# Patient Record
Sex: MALE | Race: WHITE | NOT HISPANIC OR LATINO | Employment: FULL TIME | URBAN - METROPOLITAN AREA
[De-identification: names, ages, dates, MRNs, and addresses within clinical notes are randomized per-mention and may not be internally consistent; named-entity substitution may affect disease eponyms.]

---

## 2024-08-04 ENCOUNTER — HOSPITAL ENCOUNTER (EMERGENCY)
Facility: HOSPITAL | Age: 44
Discharge: HOME | End: 2024-08-04
Attending: EMERGENCY MEDICINE
Payer: COMMERCIAL

## 2024-08-04 VITALS
OXYGEN SATURATION: 98 % | DIASTOLIC BLOOD PRESSURE: 72 MMHG | TEMPERATURE: 98 F | SYSTOLIC BLOOD PRESSURE: 120 MMHG | WEIGHT: 151.44 LBS | RESPIRATION RATE: 20 BRPM | HEART RATE: 69 BPM

## 2024-08-04 DIAGNOSIS — L03.211 FACIAL CELLULITIS: ICD-10-CM

## 2024-08-04 DIAGNOSIS — K11.20 PAROTID SIALADENITIS: Primary | ICD-10-CM

## 2024-08-04 LAB
ALBUMIN SERPL BCP-MCNC: 4.5 G/DL (ref 3.5–5.2)
ALLENS TEST: ABNORMAL
ALP SERPL-CCNC: 83 U/L (ref 55–135)
ALT SERPL W/O P-5'-P-CCNC: 43 U/L (ref 10–44)
ANION GAP SERPL CALC-SCNC: 17 MMOL/L (ref 8–16)
ANION GAP SERPL CALC-SCNC: 9 MMOL/L (ref 8–16)
AST SERPL-CCNC: 37 U/L (ref 10–40)
BASOPHILS # BLD AUTO: 0.03 K/UL (ref 0–0.2)
BASOPHILS NFR BLD: 0.3 % (ref 0–1.9)
BILIRUB SERPL-MCNC: 0.5 MG/DL (ref 0.1–1)
BUN SERPL-MCNC: 12 MG/DL (ref 6–20)
BUN SERPL-MCNC: 13 MG/DL (ref 6–30)
CALCIUM SERPL-MCNC: 9.5 MG/DL (ref 8.7–10.5)
CHLORIDE SERPL-SCNC: 105 MMOL/L (ref 95–110)
CHLORIDE SERPL-SCNC: 99 MMOL/L (ref 95–110)
CO2 SERPL-SCNC: 26 MMOL/L (ref 23–29)
CREAT SERPL-MCNC: 1.2 MG/DL (ref 0.5–1.4)
CREAT SERPL-MCNC: 1.2 MG/DL (ref 0.5–1.4)
DIFFERENTIAL METHOD BLD: ABNORMAL
EOSINOPHIL # BLD AUTO: 0.1 K/UL (ref 0–0.5)
EOSINOPHIL NFR BLD: 1.3 % (ref 0–8)
ERYTHROCYTE [DISTWIDTH] IN BLOOD BY AUTOMATED COUNT: 12.5 % (ref 11.5–14.5)
EST. GFR  (NO RACE VARIABLE): >60 ML/MIN/1.73 M^2
GLUCOSE SERPL-MCNC: 139 MG/DL (ref 70–110)
GLUCOSE SERPL-MCNC: 143 MG/DL (ref 70–110)
HCT VFR BLD AUTO: 41.4 % (ref 40–54)
HCT VFR BLD CALC: 42 %PCV (ref 36–54)
HGB BLD-MCNC: 14.1 G/DL (ref 14–18)
IMM GRANULOCYTES # BLD AUTO: 0.03 K/UL (ref 0–0.04)
IMM GRANULOCYTES NFR BLD AUTO: 0.3 % (ref 0–0.5)
LYMPHOCYTES # BLD AUTO: 1.7 K/UL (ref 1–4.8)
LYMPHOCYTES NFR BLD: 18.3 % (ref 18–48)
MCH RBC QN AUTO: 28.7 PG (ref 27–31)
MCHC RBC AUTO-ENTMCNC: 34.1 G/DL (ref 32–36)
MCV RBC AUTO: 84 FL (ref 82–98)
MONOCYTES # BLD AUTO: 0.5 K/UL (ref 0.3–1)
MONOCYTES NFR BLD: 5.3 % (ref 4–15)
NEUTROPHILS # BLD AUTO: 7.1 K/UL (ref 1.8–7.7)
NEUTROPHILS NFR BLD: 74.5 % (ref 38–73)
NRBC BLD-RTO: 0 /100 WBC
PLATELET # BLD AUTO: 237 K/UL (ref 150–450)
PMV BLD AUTO: 9.7 FL (ref 9.2–12.9)
POC IONIZED CALCIUM: 1.23 MMOL/L (ref 1.06–1.42)
POC TCO2 (MEASURED): 30 MMOL/L (ref 23–29)
POTASSIUM BLD-SCNC: 3.7 MMOL/L (ref 3.5–5.1)
POTASSIUM SERPL-SCNC: 3.8 MMOL/L (ref 3.5–5.1)
PROT SERPL-MCNC: 7.7 G/DL (ref 6–8.4)
RBC # BLD AUTO: 4.92 M/UL (ref 4.6–6.2)
SAMPLE: ABNORMAL
SITE: ABNORMAL
SODIUM BLD-SCNC: 142 MMOL/L (ref 136–145)
SODIUM SERPL-SCNC: 140 MMOL/L (ref 136–145)
WBC # BLD AUTO: 9.52 K/UL (ref 3.9–12.7)

## 2024-08-04 PROCEDURE — 96374 THER/PROPH/DIAG INJ IV PUSH: CPT

## 2024-08-04 PROCEDURE — 96361 HYDRATE IV INFUSION ADD-ON: CPT

## 2024-08-04 PROCEDURE — 25500020 PHARM REV CODE 255: Performed by: EMERGENCY MEDICINE

## 2024-08-04 PROCEDURE — 99285 EMERGENCY DEPT VISIT HI MDM: CPT | Mod: 25

## 2024-08-04 PROCEDURE — 63600175 PHARM REV CODE 636 W HCPCS

## 2024-08-04 PROCEDURE — 82962 GLUCOSE BLOOD TEST: CPT

## 2024-08-04 PROCEDURE — 80053 COMPREHEN METABOLIC PANEL: CPT

## 2024-08-04 PROCEDURE — 82565 ASSAY OF CREATININE: CPT

## 2024-08-04 PROCEDURE — 85025 COMPLETE CBC W/AUTO DIFF WBC: CPT

## 2024-08-04 PROCEDURE — 25000003 PHARM REV CODE 250

## 2024-08-04 RX ORDER — DEXAMETHASONE SODIUM PHOSPHATE 4 MG/ML
8 INJECTION, SOLUTION INTRA-ARTICULAR; INTRALESIONAL; INTRAMUSCULAR; INTRAVENOUS; SOFT TISSUE
Status: COMPLETED | OUTPATIENT
Start: 2024-08-04 | End: 2024-08-04

## 2024-08-04 RX ORDER — SODIUM CHLORIDE 9 MG/ML
1000 INJECTION, SOLUTION INTRAVENOUS
Status: COMPLETED | OUTPATIENT
Start: 2024-08-04 | End: 2024-08-04

## 2024-08-04 RX ORDER — AMOXICILLIN AND CLAVULANATE POTASSIUM 875; 125 MG/1; MG/1
1 TABLET, FILM COATED ORAL 2 TIMES DAILY
Qty: 14 TABLET | Refills: 0 | Status: SHIPPED | OUTPATIENT
Start: 2024-08-04

## 2024-08-04 RX ADMIN — SODIUM CHLORIDE 1000 ML: 9 INJECTION, SOLUTION INTRAVENOUS at 04:08

## 2024-08-04 RX ADMIN — DEXAMETHASONE SODIUM PHOSPHATE 8 MG: 4 INJECTION INTRA-ARTICULAR; INTRALESIONAL; INTRAMUSCULAR; INTRAVENOUS; SOFT TISSUE at 04:08

## 2024-08-04 RX ADMIN — IOHEXOL 75 ML: 350 INJECTION, SOLUTION INTRAVENOUS at 05:08

## 2024-11-16 ENCOUNTER — HOSPITAL ENCOUNTER (EMERGENCY)
Facility: HOSPITAL | Age: 44
Discharge: HOME | End: 2024-11-16
Attending: EMERGENCY MEDICINE
Payer: COMMERCIAL

## 2024-11-16 VITALS
TEMPERATURE: 98 F | HEART RATE: 78 BPM | OXYGEN SATURATION: 98 % | SYSTOLIC BLOOD PRESSURE: 120 MMHG | WEIGHT: 165 LBS | BODY MASS INDEX: 26.52 KG/M2 | RESPIRATION RATE: 18 BRPM | HEIGHT: 66 IN | DIASTOLIC BLOOD PRESSURE: 69 MMHG

## 2024-11-16 DIAGNOSIS — K11.20 SIALADENITIS: Primary | ICD-10-CM

## 2024-11-16 LAB
ALBUMIN SERPL BCP-MCNC: 4 G/DL (ref 3.5–5.2)
ALP SERPL-CCNC: 79 U/L (ref 40–150)
ALT SERPL W/O P-5'-P-CCNC: 45 U/L (ref 10–44)
ANION GAP SERPL CALC-SCNC: 9 MMOL/L (ref 8–16)
AST SERPL-CCNC: 31 U/L (ref 10–40)
BASOPHILS # BLD AUTO: 0.02 K/UL (ref 0–0.2)
BASOPHILS NFR BLD: 0.3 % (ref 0–1.9)
BILIRUB SERPL-MCNC: 0.6 MG/DL (ref 0.1–1)
BUN SERPL-MCNC: 10 MG/DL (ref 6–20)
CALCIUM SERPL-MCNC: 9.3 MG/DL (ref 8.7–10.5)
CHLORIDE SERPL-SCNC: 109 MMOL/L (ref 95–110)
CO2 SERPL-SCNC: 23 MMOL/L (ref 23–29)
CREAT SERPL-MCNC: 0.9 MG/DL (ref 0.5–1.4)
DIFFERENTIAL METHOD BLD: ABNORMAL
EOSINOPHIL # BLD AUTO: 0.1 K/UL (ref 0–0.5)
EOSINOPHIL NFR BLD: 1 % (ref 0–8)
ERYTHROCYTE [DISTWIDTH] IN BLOOD BY AUTOMATED COUNT: 12.5 % (ref 11.5–14.5)
EST. GFR  (NO RACE VARIABLE): >60 ML/MIN/1.73 M^2
GLUCOSE SERPL-MCNC: 104 MG/DL (ref 70–110)
HCT VFR BLD AUTO: 43 % (ref 40–54)
HGB BLD-MCNC: 13.9 G/DL (ref 14–18)
IMM GRANULOCYTES # BLD AUTO: 0.01 K/UL (ref 0–0.04)
IMM GRANULOCYTES NFR BLD AUTO: 0.1 % (ref 0–0.5)
LYMPHOCYTES # BLD AUTO: 1.5 K/UL (ref 1–4.8)
LYMPHOCYTES NFR BLD: 21.6 % (ref 18–48)
MCH RBC QN AUTO: 27.4 PG (ref 27–31)
MCHC RBC AUTO-ENTMCNC: 32.3 G/DL (ref 32–36)
MCV RBC AUTO: 85 FL (ref 82–98)
MONOCYTES # BLD AUTO: 0.6 K/UL (ref 0.3–1)
MONOCYTES NFR BLD: 8.2 % (ref 4–15)
NEUTROPHILS # BLD AUTO: 4.9 K/UL (ref 1.8–7.7)
NEUTROPHILS NFR BLD: 68.8 % (ref 38–73)
NRBC BLD-RTO: 0 /100 WBC
PLATELET # BLD AUTO: 242 K/UL (ref 150–450)
PMV BLD AUTO: 9.3 FL (ref 9.2–12.9)
POTASSIUM SERPL-SCNC: 3.9 MMOL/L (ref 3.5–5.1)
PROT SERPL-MCNC: 7.4 G/DL (ref 6–8.4)
RBC # BLD AUTO: 5.08 M/UL (ref 4.6–6.2)
SODIUM SERPL-SCNC: 141 MMOL/L (ref 136–145)
WBC # BLD AUTO: 7.08 K/UL (ref 3.9–12.7)

## 2024-11-16 PROCEDURE — 96374 THER/PROPH/DIAG INJ IV PUSH: CPT

## 2024-11-16 PROCEDURE — 63600175 PHARM REV CODE 636 W HCPCS: Performed by: NURSE PRACTITIONER

## 2024-11-16 PROCEDURE — 85025 COMPLETE CBC W/AUTO DIFF WBC: CPT | Performed by: NURSE PRACTITIONER

## 2024-11-16 PROCEDURE — 99285 EMERGENCY DEPT VISIT HI MDM: CPT | Mod: 25

## 2024-11-16 PROCEDURE — 80053 COMPREHEN METABOLIC PANEL: CPT | Performed by: NURSE PRACTITIONER

## 2024-11-16 RX ORDER — NAPROXEN 500 MG/1
500 TABLET ORAL 2 TIMES DAILY WITH MEALS
Qty: 14 TABLET | Refills: 0 | Status: SHIPPED | OUTPATIENT
Start: 2024-11-16

## 2024-11-16 RX ORDER — CEPHALEXIN 500 MG/1
500 CAPSULE ORAL EVERY 6 HOURS
Qty: 40 CAPSULE | Refills: 0 | Status: SHIPPED | OUTPATIENT
Start: 2024-11-16 | End: 2024-11-26

## 2024-11-16 RX ORDER — KETOROLAC TROMETHAMINE 30 MG/ML
15 INJECTION, SOLUTION INTRAMUSCULAR; INTRAVENOUS
Status: COMPLETED | OUTPATIENT
Start: 2024-11-16 | End: 2024-11-16

## 2024-11-16 RX ADMIN — KETOROLAC TROMETHAMINE 15 MG: 30 INJECTION, SOLUTION INTRAMUSCULAR at 10:11

## 2024-11-16 NOTE — DISCHARGE INSTRUCTIONS
????? ???????? ??????? ??? ?????. ??? ?? ??? ?? ???????? (????????? ????????)? ??? ???? ??????????.  ????? ??? ?????? ???? ???? ??? ????? ???? ?? ??.  ?? ?????? ???????????? ?? ??????????? ?? ????? ?????? ???????? ??? ???????????? ?????? ????? ????? ??? ??????. ?? ??? ?????? ??????? ??? ????? ??????? ???????? ?? ????? ???? ??????.

## 2024-11-16 NOTE — ED TRIAGE NOTES
Pt to ED reporting left facial swelling and pain that radiates from under the ear to the jaw x 5 months. Pt reporting pain is on and off. Pt recently seen an ENT and a dentist and was told he needs imaging. Pt reporting left tomsil feels weird and different in comparison tot he right.

## 2024-11-16 NOTE — ED PROVIDER NOTES
Encounter Date: 11/16/2024       History     Chief Complaint   Patient presents with    Facial Pain     Pt c/o left sided facial pain and throat pain x 2 days. No fever      Chief complaint:  Left-sided facial swelling     History of present illness: Patient is a 44-year-old male who presents the emergency department stating that he has had 5 months of left-sided facial pain and swelling.  He reports the pain is intermittent.  He has been to an ENT who said his left ear had no issues and to a dentist who said this is not a dental issue.  He reports 8/10 severity of discomfort.  Endorses subjective fever but denies use of any medications or treatments denies congestion runny nose sinus pressure cough wheezing and shortness of breath.    The history is provided by the patient. No  was used.     Review of patient's allergies indicates:  No Known Allergies  Past Medical History:   Diagnosis Date    Mixed hyperlipidemia      History reviewed. No pertinent surgical history.  No family history on file.  Social History     Tobacco Use    Smoking status: Never    Smokeless tobacco: Never   Substance Use Topics    Alcohol use: Never    Drug use: Never     Review of Systems   HENT:  Positive for facial swelling.        Physical Exam     Initial Vitals [11/16/24 0943]   BP Pulse Resp Temp SpO2   120/69 78 18 98.2 °F (36.8 °C) 98 %      MAP       --         Physical Exam    Nursing note and vitals reviewed.  Constitutional: He appears well-developed and well-nourished. He is not diaphoretic. No distress.   HENT:   Head: Normocephalic and atraumatic.   Right Ear: Hearing, tympanic membrane, external ear and ear canal normal.   Left Ear: Hearing, tympanic membrane, external ear and ear canal normal.   Nose: Nose normal. Mouth/Throat: Oropharynx is clear and moist. No oropharyngeal exudate, posterior oropharyngeal edema, posterior oropharyngeal erythema or tonsillar abscesses.   Mild left-sided facial swelling  without tenderness or redness.   Eyes: Pupils are equal, round, and reactive to light. Right eye exhibits no discharge. Left eye exhibits no discharge. No scleral icterus.   Neck:   Normal range of motion.  Pulmonary/Chest: No respiratory distress.   Abdominal: He exhibits no distension.   Musculoskeletal:         General: Normal range of motion.      Cervical back: Normal range of motion.     Neurological: He is alert and oriented to person, place, and time.   Skin: Skin is dry. Capillary refill takes less than 2 seconds.         ED Course   Procedures  Labs Reviewed   CBC W/ AUTO DIFFERENTIAL - Abnormal       Result Value    WBC 7.08      RBC 5.08      Hemoglobin 13.9 (*)     Hematocrit 43.0      MCV 85      MCH 27.4      MCHC 32.3      RDW 12.5      Platelets 242      MPV 9.3      Immature Granulocytes 0.1      Gran # (ANC) 4.9      Immature Grans (Abs) 0.01      Lymph # 1.5      Mono # 0.6      Eos # 0.1      Baso # 0.02      nRBC 0      Gran % 68.8      Lymph % 21.6      Mono % 8.2      Eosinophil % 1.0      Basophil % 0.3      Differential Method Automated     COMPREHENSIVE METABOLIC PANEL - Abnormal    Sodium 141      Potassium 3.9      Chloride 109      CO2 23      Glucose 104      BUN 10      Creatinine 0.9      Calcium 9.3      Total Protein 7.4      Albumin 4.0      Total Bilirubin 0.6      Alkaline Phosphatase 79      AST 31      ALT 45 (*)     eGFR >60      Anion Gap 9            Imaging Results              CT Maxillofacial Without Contrast (Final result)  Result time 11/16/24 10:37:42      Final result by Evin Bates DO (11/16/24 10:37:42)                   Impression:      1. Persistent prominence and mild heterogeneity of the left parotid gland likely sequela of sialadenitis.  No obvious abscess collections.      Electronically signed by: Evin Bates DO  Date:    11/16/2024  Time:    10:37               Narrative:    EXAMINATION:  CT MAXILLOFACIAL WITHOUT CONTRAST    CLINICAL  HISTORY:  Sublingual/submandibular abscess;    TECHNIQUE:  Low dose axial images, sagittal and coronal reformations were obtained through the face.  Contrast was not administered.    COMPARISON:  CT of the facial bones was obtained without contrast from the level of the frontal sinuses through the level of the mandible.    FINDINGS:  The paranasal sinuses are clear.  The visualized brain parenchyma is unremarkable in appearance.  The mucosal space appears to be symmetric.  There is mild heterogeneity of the left parotid gland when compared to the right.  The appearance is similar to the prior exam.  Sialadenitis is not excluded.  No obvious sialolithiasis seen within the parotid duct.  The submandibular glands are somewhat asymmetric in size with the right submandibular gland smaller than the left.  No obvious abscess collections suggest on this noncontrast exam.  There are small nonenlarged by size criteria lymph nodes seen bilaterally that are similar to prior exam.  No facial bone fractures are identified.                                       Medications   ketorolac injection 15 mg (15 mg Intravenous Given 11/16/24 1025)     Medical Decision Making  Patient is a 44-year-old male who presents the emergency department stating that he has had 5 months of left-sided facial pain and swelling.  He reports the pain is intermittent.  He has been to an ENT who said his left ear had no issues and to a dentist who said this is not a dental issue.  He reports 8/10 severity of discomfort.  Endorses subjective fever but denies use of any medications or treatments denies congestion runny nose sinus pressure cough wheezing and shortness of breath.    Mild left-sided facial swelling without tenderness or redness. Right Ear: Hearing, tympanic membrane, external ear and ear canal normal. Left Ear: Hearing, tympanic membrane, external ear and ear canal normal. No oropharyngeal exudate, posterior oropharyngeal edema, posterior  "oropharyngeal erythema or tonsillar abscesses.     Differential diagnosis includes otitis media dental abscess or infection deep space abscess or infection mastoiditis sialadenitis.      Problems Addressed:  Sialadenitis: chronic illness or injury     Details: Given Toradol in the ER discharged on Keflex and Naprosyn for pain follow up with ENT.  Return for worsening or changes in condition.    Amount and/or Complexity of Data Reviewed  Labs: ordered. Decision-making details documented in ED Course.  Radiology: ordered. Decision-making details documented in ED Course.  Discussion of management or test interpretation with external provider(s): Vital signs at the time of disposition were:  /69   Pulse 78   Temp 98.2 °F (36.8 °C) (Oral)   Resp 18   Ht 5' 6" (1.676 m)   Wt 74.8 kg (165 lb)   SpO2 98%   BMI 26.63 kg/m²       See AVS for additional recommendations. Medications listed herein were prescribed after reviewing the patient's allergies, medication list, history, most recent laboratories as available.  Referrals below were provided after reviewing the patient's previous medical providers. He understands he  should return for any worsening or changes in condition.  Prior to discharge the patient was asked if he  had any additional concerns or complaints and he declined. The patient was given an opportunity to ask questions and all were answered to his satisfaction.     Risk  OTC drugs.  Prescription drug management.  Diagnosis or treatment significantly limited by social determinants of health.               ED Course as of 11/16/24 1339   Sat Nov 16, 2024   1016 BP: 120/69 [VC]   1016 Temp: 98.2 °F (36.8 °C) [VC]   1016 Temp Source: Oral [VC]   1016 Pulse: 78 [VC]   1016 Resp: 18 [VC]   1016 SpO2: 98 % [VC]   1035 CBC auto differential(!)  Mild anemia, normal cbc. [VC]   1041 CT Maxillofacial Without Contrast    1. Persistent prominence and mild heterogeneity of the left parotid gland likely sequela " of sialadenitis.  No obvious abscess collections.    [VC]   1123 Comprehensive metabolic panel(!)  Normal cmp. [VC]      ED Course User Index  [VC] Be Mccallum DNP                           Clinical Impression:  Final diagnoses:  [K11.20] Sialadenitis (Primary)          ED Disposition Condition    Discharge Stable          ED Prescriptions       Medication Sig Dispense Start Date End Date Auth. Provider    cephALEXin (KEFLEX) 500 MG capsule Take 1 capsule (500 mg total) by mouth every 6 (six) hours. for 10 days 40 capsule 11/16/2024 11/26/2024 Be Mccallum DNP    naproxen (NAPROSYN) 500 MG tablet Take 1 tablet (500 mg total) by mouth 2 (two) times daily with meals. 14 tablet 11/16/2024 -- Be Mccallum DNP          Follow-up Information       Follow up With Specialties Details Why Contact Info    Rosa Humphreys MD Otolaryngology Schedule an appointment as soon as possible for a visit   [address] [place] [address]  [phone]               Be Mccallum DNP  11/16/24 3812

## 2024-11-26 ENCOUNTER — TELEPHONE (OUTPATIENT)
Dept: OTOLARYNGOLOGY | Facility: CLINIC | Age: 44
End: 2024-11-26
Payer: COMMERCIAL

## 2024-11-26 NOTE — LETTER
"November 26, 2024    Kota Trammell  [address]  [place] [address]             [facility]  [address]   [facility] [address]  Phone: [phone] Dear Mr. Trammell:    Our officer received a referral for you to schedule an appointment with a ENT Specialist for Sialadenitis from when you visited the Emergency Room on 11/14/2024. Unfortunately [facility] does not accept your current insurance "[employer]". You can contact your insurance company to verify what ENT specialist is in network.     If you have any questions or concerns, please don't hesitate to call our office at [phone].    Sincerely,        Kelli Swift, Special    Rosa Humphreys MD     "